# Patient Record
Sex: FEMALE | Race: BLACK OR AFRICAN AMERICAN
[De-identification: names, ages, dates, MRNs, and addresses within clinical notes are randomized per-mention and may not be internally consistent; named-entity substitution may affect disease eponyms.]

---

## 2018-10-05 PROBLEM — Z00.00 ENCOUNTER FOR PREVENTIVE HEALTH EXAMINATION: Status: ACTIVE | Noted: 2018-10-05

## 2018-10-30 ENCOUNTER — APPOINTMENT (OUTPATIENT)
Dept: PEDIATRIC ALLERGY IMMUNOLOGY | Facility: CLINIC | Age: 48
End: 2018-10-30
Payer: COMMERCIAL

## 2018-10-30 VITALS — WEIGHT: 153.99 LBS | DIASTOLIC BLOOD PRESSURE: 69 MMHG | SYSTOLIC BLOOD PRESSURE: 114 MMHG | HEART RATE: 82 BPM

## 2018-10-30 DIAGNOSIS — T78.1XXA OTHER ADVERSE FOOD REACTIONS, NOT ELSEWHERE CLASSIFIED, INITIAL ENCOUNTER: ICD-10-CM

## 2018-10-30 DIAGNOSIS — J30.89 OTHER ALLERGIC RHINITIS: ICD-10-CM

## 2018-10-30 DIAGNOSIS — J31.0 CHRONIC RHINITIS: ICD-10-CM

## 2018-10-30 DIAGNOSIS — E73.9 LACTOSE INTOLERANCE, UNSPECIFIED: ICD-10-CM

## 2018-10-30 PROCEDURE — 95004 PERQ TESTS W/ALRGNC XTRCS: CPT

## 2018-10-30 PROCEDURE — 99244 OFF/OP CNSLTJ NEW/EST MOD 40: CPT | Mod: 25

## 2018-10-30 RX ORDER — LEVOTHYROXINE SODIUM 0.1 MG/1
100 TABLET ORAL
Refills: 0 | Status: ACTIVE | COMMUNITY
Start: 2018-10-30

## 2019-09-12 ENCOUNTER — NON-APPOINTMENT (OUTPATIENT)
Age: 49
End: 2019-09-12

## 2019-09-12 ENCOUNTER — APPOINTMENT (OUTPATIENT)
Dept: CARDIOLOGY | Facility: CLINIC | Age: 49
End: 2019-09-12
Payer: COMMERCIAL

## 2019-09-12 VITALS
BODY MASS INDEX: 23.25 KG/M2 | WEIGHT: 157 LBS | HEART RATE: 86 BPM | OXYGEN SATURATION: 96 % | DIASTOLIC BLOOD PRESSURE: 64 MMHG | HEIGHT: 69 IN | SYSTOLIC BLOOD PRESSURE: 110 MMHG

## 2019-09-12 VITALS — SYSTOLIC BLOOD PRESSURE: 114 MMHG | DIASTOLIC BLOOD PRESSURE: 68 MMHG

## 2019-09-12 DIAGNOSIS — Z83.3 FAMILY HISTORY OF DIABETES MELLITUS: ICD-10-CM

## 2019-09-12 DIAGNOSIS — Z82.49 FAMILY HISTORY OF ISCHEMIC HEART DISEASE AND OTHER DISEASES OF THE CIRCULATORY SYSTEM: ICD-10-CM

## 2019-09-12 DIAGNOSIS — Z82.3 FAMILY HISTORY OF STROKE: ICD-10-CM

## 2019-09-12 DIAGNOSIS — Z09 ENCOUNTER FOR FOLLOW-UP EXAMINATION AFTER COMPLETED TREATMENT FOR CONDITIONS OTHER THAN MALIGNANT NEOPLASM: ICD-10-CM

## 2019-09-12 DIAGNOSIS — E03.9 HYPOTHYROIDISM, UNSPECIFIED: ICD-10-CM

## 2019-09-12 DIAGNOSIS — Z78.9 OTHER SPECIFIED HEALTH STATUS: ICD-10-CM

## 2019-09-12 DIAGNOSIS — Z56.0 UNEMPLOYMENT, UNSPECIFIED: ICD-10-CM

## 2019-09-12 PROCEDURE — 99204 OFFICE O/P NEW MOD 45 MIN: CPT

## 2019-09-12 PROCEDURE — 93000 ELECTROCARDIOGRAM COMPLETE: CPT

## 2019-09-12 SDOH — ECONOMIC STABILITY - INCOME SECURITY: UNEMPLOYMENT, UNSPECIFIED: Z56.0

## 2019-09-12 NOTE — PHYSICAL EXAM
[General Appearance - Well Developed] : well developed [Normal Conjunctiva] : the conjunctiva exhibited no abnormalities [Normal Oral Mucosa] : normal oral mucosa [Normal Jugular Venous V Waves Present] : normal jugular venous V waves present [Heart Rate And Rhythm] : heart rate and rhythm were normal [] : no respiratory distress [Abnormal Walk] : normal gait [Bowel Sounds] : normal bowel sounds [Nail Clubbing] : no clubbing of the fingernails [Skin Color & Pigmentation] : normal skin color and pigmentation [Oriented To Time, Place, And Person] : oriented to person, place, and time

## 2019-09-12 NOTE — DISCUSSION/SUMMARY
[FreeTextEntry1] : Patient with no significant family history of premature CAD, or other risk factors. \par Do not think that patient requires any further testing to change her risk category. \par Has a good diet and very lifestyle. \par No further cardiac workup. \par Follow up prn.

## 2019-09-12 NOTE — HISTORY OF PRESENT ILLNESS
[FreeTextEntry1] : Patient with family history of CAD presenting for routine evaluation. No history of CAD, no smoking, no HLD. \par No previous cardiac workup. \par Has been Synthroid. \par Denies any chest pain, no shortness of breath. Elliptical for 1 hour every day. \par No history of AAA.